# Patient Record
Sex: FEMALE | Race: WHITE | NOT HISPANIC OR LATINO | Employment: UNEMPLOYED
[De-identification: names, ages, dates, MRNs, and addresses within clinical notes are randomized per-mention and may not be internally consistent; named-entity substitution may affect disease eponyms.]

---

## 2022-11-03 ENCOUNTER — EXTERNAL LAB (OUTPATIENT)
Dept: HEALTH INFORMATION MANAGEMENT | Facility: OTHER | Age: 42
End: 2022-11-03

## 2022-11-03 LAB — LAB RESULT: NORMAL

## 2022-11-08 LAB — CYTOLOGY CVX/VAG DOC THIN PREP: NORMAL

## 2023-04-12 ENCOUNTER — OFFICE VISIT (OUTPATIENT)
Dept: OBGYN | Age: 43
End: 2023-04-12

## 2023-04-12 VITALS
OXYGEN SATURATION: 100 % | HEIGHT: 62 IN | DIASTOLIC BLOOD PRESSURE: 85 MMHG | HEART RATE: 81 BPM | BODY MASS INDEX: 24.34 KG/M2 | TEMPERATURE: 97.8 F | WEIGHT: 132.28 LBS | SYSTOLIC BLOOD PRESSURE: 135 MMHG

## 2023-04-12 DIAGNOSIS — Z86.39 HISTORY OF VITAMIN D DEFICIENCY: ICD-10-CM

## 2023-04-12 DIAGNOSIS — N92.0 MENORRHAGIA WITH REGULAR CYCLE: Primary | ICD-10-CM

## 2023-04-12 DIAGNOSIS — R39.15 URINARY URGENCY: ICD-10-CM

## 2023-04-12 DIAGNOSIS — Z12.31 ENCOUNTER FOR SCREENING MAMMOGRAM FOR MALIGNANT NEOPLASM OF BREAST: ICD-10-CM

## 2023-04-12 DIAGNOSIS — N85.2 BULKY OR ENLARGED UTERUS: ICD-10-CM

## 2023-04-12 PROCEDURE — 82306 VITAMIN D 25 HYDROXY: CPT | Performed by: INTERNAL MEDICINE

## 2023-04-12 PROCEDURE — 84443 ASSAY THYROID STIM HORMONE: CPT | Performed by: INTERNAL MEDICINE

## 2023-04-12 PROCEDURE — 36415 COLL VENOUS BLD VENIPUNCTURE: CPT | Performed by: NURSE PRACTITIONER

## 2023-04-12 PROCEDURE — 99214 OFFICE O/P EST MOD 30 MIN: CPT | Performed by: NURSE PRACTITIONER

## 2023-04-12 PROCEDURE — 81003 URINALYSIS AUTO W/O SCOPE: CPT | Performed by: INTERNAL MEDICINE

## 2023-04-12 PROCEDURE — 87086 URINE CULTURE/COLONY COUNT: CPT | Performed by: INTERNAL MEDICINE

## 2023-04-12 PROCEDURE — 85025 COMPLETE CBC W/AUTO DIFF WBC: CPT | Performed by: INTERNAL MEDICINE

## 2023-04-13 LAB
25(OH)D3+25(OH)D2 SERPL-MCNC: 14.5 NG/ML (ref 30–100)
APPEARANCE UR: CLEAR
BASOPHILS # BLD: 0.1 K/MCL (ref 0–0.3)
BASOPHILS NFR BLD: 1 %
BILIRUB UR QL STRIP: NEGATIVE
COLOR UR: ABNORMAL
DEPRECATED RDW RBC: 51.5 FL (ref 39–50)
EOSINOPHIL # BLD: 0.1 K/MCL (ref 0–0.5)
EOSINOPHIL NFR BLD: 2 %
ERYTHROCYTE [DISTWIDTH] IN BLOOD: 14.6 % (ref 11–15)
GLUCOSE UR STRIP-MCNC: NEGATIVE MG/DL
HCT VFR BLD CALC: 44.5 % (ref 36–46.5)
HGB BLD-MCNC: 13.8 G/DL (ref 12–15.5)
HGB UR QL STRIP: NEGATIVE
IMM GRANULOCYTES # BLD AUTO: 0 K/MCL (ref 0–0.2)
IMM GRANULOCYTES # BLD: 0 %
KETONES UR STRIP-MCNC: NEGATIVE MG/DL
LEUKOCYTE ESTERASE UR QL STRIP: NEGATIVE
LYMPHOCYTES # BLD: 1.8 K/MCL (ref 1–4.8)
LYMPHOCYTES NFR BLD: 27 %
MCH RBC QN AUTO: 29.7 PG (ref 26–34)
MCHC RBC AUTO-ENTMCNC: 31 G/DL (ref 32–36.5)
MCV RBC AUTO: 95.7 FL (ref 78–100)
MONOCYTES # BLD: 0.7 K/MCL (ref 0.3–0.9)
MONOCYTES NFR BLD: 10 %
NEUTROPHILS # BLD: 4.1 K/MCL (ref 1.8–7.7)
NEUTROPHILS NFR BLD: 60 %
NITRITE UR QL STRIP: NEGATIVE
NRBC BLD MANUAL-RTO: 0 /100 WBC
PH UR STRIP: 7.5 [PH] (ref 5–7)
PLATELET # BLD AUTO: 212 K/MCL (ref 140–450)
PROT UR STRIP-MCNC: NEGATIVE MG/DL
RBC # BLD: 4.65 MIL/MCL (ref 4–5.2)
SP GR UR STRIP: 1.02 (ref 1–1.03)
TSH SERPL-ACNC: 1.57 MCUNITS/ML (ref 0.35–5)
UROBILINOGEN UR STRIP-MCNC: 0.2 MG/DL
WBC # BLD: 6.8 K/MCL (ref 4.2–11)

## 2023-04-14 LAB — BACTERIA UR CULT: NORMAL

## 2023-04-18 ENCOUNTER — APPOINTMENT (OUTPATIENT)
Dept: OBGYN | Age: 43
End: 2023-04-18

## 2023-05-25 ENCOUNTER — OFFICE VISIT (OUTPATIENT)
Dept: OBGYN | Age: 43
End: 2023-05-25

## 2023-05-25 ENCOUNTER — APPOINTMENT (OUTPATIENT)
Dept: OBGYN | Age: 43
End: 2023-05-25

## 2023-05-25 VITALS
WEIGHT: 132.28 LBS | HEART RATE: 82 BPM | DIASTOLIC BLOOD PRESSURE: 76 MMHG | TEMPERATURE: 97 F | BODY MASS INDEX: 24.34 KG/M2 | HEIGHT: 62 IN | SYSTOLIC BLOOD PRESSURE: 124 MMHG | OXYGEN SATURATION: 98 %

## 2023-05-25 DIAGNOSIS — N92.0 MENORRHAGIA WITH REGULAR CYCLE: Primary | ICD-10-CM

## 2023-05-25 DIAGNOSIS — Z87.42 HISTORY OF MENORRHAGIA: Primary | ICD-10-CM

## 2023-05-25 DIAGNOSIS — N85.2 BULKY OR ENLARGED UTERUS: ICD-10-CM

## 2023-05-25 PROCEDURE — 76830 TRANSVAGINAL US NON-OB: CPT | Performed by: OBSTETRICS & GYNECOLOGY

## 2023-05-25 PROCEDURE — 99213 OFFICE O/P EST LOW 20 MIN: CPT | Performed by: OBSTETRICS & GYNECOLOGY

## 2023-06-12 ENCOUNTER — APPOINTMENT (OUTPATIENT)
Dept: OBGYN | Age: 43
End: 2023-06-12

## 2023-06-27 ENCOUNTER — APPOINTMENT (OUTPATIENT)
Dept: OBGYN | Age: 43
End: 2023-06-27

## 2023-07-10 ENCOUNTER — APPOINTMENT (OUTPATIENT)
Dept: OBGYN | Age: 43
End: 2023-07-10

## 2023-07-17 ENCOUNTER — OFFICE VISIT (OUTPATIENT)
Dept: OBGYN | Age: 43
End: 2023-07-17

## 2023-07-17 VITALS
WEIGHT: 132.28 LBS | HEART RATE: 80 BPM | BODY MASS INDEX: 24.34 KG/M2 | HEIGHT: 62 IN | DIASTOLIC BLOOD PRESSURE: 82 MMHG | SYSTOLIC BLOOD PRESSURE: 132 MMHG | TEMPERATURE: 97 F | OXYGEN SATURATION: 99 %

## 2023-07-17 DIAGNOSIS — N92.4 MENORRHAGIA, PREMENOPAUSAL: ICD-10-CM

## 2023-07-17 DIAGNOSIS — Z30.430 ENCOUNTER FOR INSERTION OF MIRENA IUD: Primary | ICD-10-CM

## 2023-07-17 DIAGNOSIS — Z30.431 IUD CHECK UP: ICD-10-CM

## 2023-07-17 LAB
B-HCG UR QL: NEGATIVE
INTERNAL PROCEDURAL CONTROLS ACCEPTABLE: YES
TEST LOT EXPIRATION DATE: NORMAL
TEST LOT NUMBER: NORMAL

## 2023-07-17 PROCEDURE — 81025 URINE PREGNANCY TEST: CPT | Performed by: OBSTETRICS & GYNECOLOGY

## 2023-07-17 PROCEDURE — 58300 INSERT INTRAUTERINE DEVICE: CPT | Performed by: OBSTETRICS & GYNECOLOGY

## 2023-07-17 PROCEDURE — 99214 OFFICE O/P EST MOD 30 MIN: CPT | Performed by: OBSTETRICS & GYNECOLOGY

## 2023-09-18 ENCOUNTER — OFFICE VISIT (OUTPATIENT)
Dept: OBGYN | Age: 43
End: 2023-09-18

## 2023-09-18 DIAGNOSIS — Z30.431 IUD CHECK UP: ICD-10-CM

## 2023-09-18 PROCEDURE — 76830 TRANSVAGINAL US NON-OB: CPT | Performed by: OBSTETRICS & GYNECOLOGY

## 2023-09-25 ENCOUNTER — APPOINTMENT (OUTPATIENT)
Dept: OBGYN | Age: 43
End: 2023-09-25

## 2023-10-16 ENCOUNTER — OFFICE VISIT (OUTPATIENT)
Dept: OBGYN | Age: 43
End: 2023-10-16

## 2023-10-16 VITALS
HEIGHT: 62 IN | HEART RATE: 67 BPM | DIASTOLIC BLOOD PRESSURE: 68 MMHG | WEIGHT: 140.43 LBS | TEMPERATURE: 98.3 F | OXYGEN SATURATION: 100 % | BODY MASS INDEX: 25.84 KG/M2 | SYSTOLIC BLOOD PRESSURE: 134 MMHG

## 2023-10-16 DIAGNOSIS — Z12.31 VISIT FOR SCREENING MAMMOGRAM: ICD-10-CM

## 2023-10-16 DIAGNOSIS — N81.4 CYSTOCELE WITH UTERINE DESCENSUS: ICD-10-CM

## 2023-10-16 DIAGNOSIS — N83.201 RIGHT OVARIAN CYST: Primary | ICD-10-CM

## 2023-10-16 PROCEDURE — 99213 OFFICE O/P EST LOW 20 MIN: CPT | Performed by: OBSTETRICS & GYNECOLOGY

## 2023-10-16 ASSESSMENT — ENCOUNTER SYMPTOMS
NEUROLOGICAL NEGATIVE: 1
RESPIRATORY NEGATIVE: 1
HEMATOLOGIC/LYMPHATIC NEGATIVE: 1
PSYCHIATRIC NEGATIVE: 1
GASTROINTESTINAL NEGATIVE: 1

## 2023-10-19 ENCOUNTER — TELEPHONE (OUTPATIENT)
Dept: OBGYN | Age: 43
End: 2023-10-19

## 2023-10-19 DIAGNOSIS — N63.10 MASS OF RIGHT BREAST, UNSPECIFIED QUADRANT: Primary | ICD-10-CM

## 2023-10-19 DIAGNOSIS — R92.343 EXTREMELY DENSE TISSUE OF BOTH BREASTS ON MAMMOGRAPHY: ICD-10-CM

## 2023-12-01 ENCOUNTER — TELEPHONE (OUTPATIENT)
Dept: OBGYN | Age: 43
End: 2023-12-01

## 2023-12-05 DIAGNOSIS — N63.10 MASS OF RIGHT BREAST, UNSPECIFIED QUADRANT: ICD-10-CM

## 2024-06-26 ENCOUNTER — TELEPHONE (OUTPATIENT)
Dept: FAMILY MEDICINE | Age: 44
End: 2024-06-26

## 2024-09-24 ENCOUNTER — APPOINTMENT (OUTPATIENT)
Dept: FAMILY MEDICINE | Age: 44
End: 2024-09-24

## 2024-09-24 VITALS
OXYGEN SATURATION: 98 % | SYSTOLIC BLOOD PRESSURE: 123 MMHG | DIASTOLIC BLOOD PRESSURE: 84 MMHG | HEIGHT: 62 IN | WEIGHT: 136.69 LBS | BODY MASS INDEX: 25.15 KG/M2 | HEART RATE: 75 BPM

## 2024-09-24 DIAGNOSIS — B37.31 CANDIDAL VULVOVAGINITIS: ICD-10-CM

## 2024-09-24 DIAGNOSIS — N60.01 BILATERAL BREAST CYSTS: ICD-10-CM

## 2024-09-24 DIAGNOSIS — Z12.11 ENCOUNTER FOR SCREENING FOR MALIGNANT NEOPLASM OF COLON: ICD-10-CM

## 2024-09-24 DIAGNOSIS — Z00.00 ROUTINE GENERAL MEDICAL EXAMINATION AT A HEALTH CARE FACILITY: Primary | ICD-10-CM

## 2024-09-24 DIAGNOSIS — N60.02 BILATERAL BREAST CYSTS: ICD-10-CM

## 2024-09-24 DIAGNOSIS — G43.109 MIGRAINE WITH AURA AND WITHOUT STATUS MIGRAINOSUS, NOT INTRACTABLE: ICD-10-CM

## 2024-09-24 LAB
BASOPHILS # BLD: 0.1 K/MCL (ref 0–0.3)
BASOPHILS NFR BLD: 1 %
DEPRECATED RDW RBC: 46.1 FL (ref 39–50)
EOSINOPHIL # BLD: 0.2 K/MCL (ref 0–0.5)
EOSINOPHIL NFR BLD: 2 %
ERYTHROCYTE [DISTWIDTH] IN BLOOD: 13.8 % (ref 11–15)
HCT VFR BLD CALC: 43.6 % (ref 36–46.5)
HGB BLD-MCNC: 14.6 G/DL (ref 12–15.5)
IMM GRANULOCYTES # BLD AUTO: 0 K/MCL (ref 0–0.2)
IMM GRANULOCYTES # BLD: 0 %
LYMPHOCYTES # BLD: 2.8 K/MCL (ref 1–4.8)
LYMPHOCYTES NFR BLD: 35 %
MCH RBC QN AUTO: 30.4 PG (ref 26–34)
MCHC RBC AUTO-ENTMCNC: 33.5 G/DL (ref 32–36.5)
MCV RBC AUTO: 90.8 FL (ref 78–100)
MONOCYTES # BLD: 0.8 K/MCL (ref 0.3–0.9)
MONOCYTES NFR BLD: 9 %
NEUTROPHILS # BLD: 4.4 K/MCL (ref 1.8–7.7)
NEUTROPHILS NFR BLD: 53 %
NRBC BLD MANUAL-RTO: 0 /100 WBC
PLATELET # BLD AUTO: 279 K/MCL (ref 140–450)
RBC # BLD: 4.8 MIL/MCL (ref 4–5.2)
WBC # BLD: 8.2 K/MCL (ref 4.2–11)

## 2024-09-24 PROCEDURE — 80050 GENERAL HEALTH PANEL: CPT | Performed by: CLINICAL MEDICAL LABORATORY

## 2024-09-24 PROCEDURE — 36415 COLL VENOUS BLD VENIPUNCTURE: CPT | Performed by: FAMILY MEDICINE

## 2024-09-24 PROCEDURE — 81001 URINALYSIS AUTO W/SCOPE: CPT | Performed by: CLINICAL MEDICAL LABORATORY

## 2024-09-24 PROCEDURE — 80061 LIPID PANEL: CPT | Performed by: CLINICAL MEDICAL LABORATORY

## 2024-09-24 RX ORDER — FLUCONAZOLE 150 MG/1
150 TABLET ORAL ONCE
Qty: 1 TABLET | Refills: 0 | Status: SHIPPED | OUTPATIENT
Start: 2024-09-24 | End: 2024-09-24

## 2024-09-24 ASSESSMENT — PATIENT HEALTH QUESTIONNAIRE - PHQ9
2. FEELING DOWN, DEPRESSED OR HOPELESS: NOT AT ALL
SUM OF ALL RESPONSES TO PHQ9 QUESTIONS 1 AND 2: 0
SUM OF ALL RESPONSES TO PHQ9 QUESTIONS 1 AND 2: 0
1. LITTLE INTEREST OR PLEASURE IN DOING THINGS: NOT AT ALL
2. FEELING DOWN, DEPRESSED OR HOPELESS: NOT AT ALL
1. LITTLE INTEREST OR PLEASURE IN DOING THINGS: NOT AT ALL
SUM OF ALL RESPONSES TO PHQ9 QUESTIONS 1 AND 2: 0
SUM OF ALL RESPONSES TO PHQ9 QUESTIONS 1 AND 2: 0
CLINICAL INTERPRETATION OF PHQ2 SCORE: NO FURTHER SCREENING NEEDED
CLINICAL INTERPRETATION OF PHQ2 SCORE: NO FURTHER SCREENING NEEDED

## 2024-09-25 LAB
ALBUMIN SERPL-MCNC: 4.2 G/DL (ref 3.4–5)
ALBUMIN/GLOB SERPL: 1.2 {RATIO} (ref 1–2.4)
ALP SERPL-CCNC: 64 UNITS/L (ref 45–117)
ALT SERPL-CCNC: 17 UNITS/L
ANION GAP SERPL CALC-SCNC: 8 MMOL/L (ref 7–19)
APPEARANCE UR: ABNORMAL
AST SERPL-CCNC: 10 UNITS/L
BACTERIA #/AREA URNS HPF: ABNORMAL /HPF
BILIRUB SERPL-MCNC: 0.3 MG/DL (ref 0.2–1)
BILIRUB UR QL STRIP: NEGATIVE
BUN SERPL-MCNC: 20 MG/DL (ref 6–20)
BUN/CREAT SERPL: 26 (ref 7–25)
CALCIUM SERPL-MCNC: 9.5 MG/DL (ref 8.4–10.2)
CHLORIDE SERPL-SCNC: 107 MMOL/L (ref 97–110)
CHOLEST SERPL-MCNC: 212 MG/DL
CHOLEST/HDLC SERPL: 3.8 {RATIO}
CO2 SERPL-SCNC: 29 MMOL/L (ref 21–32)
COLOR UR: ABNORMAL
CREAT SERPL-MCNC: 0.77 MG/DL (ref 0.51–0.95)
EGFRCR SERPLBLD CKD-EPI 2021: >90 ML/MIN/{1.73_M2}
FASTING DURATION TIME PATIENT: ABNORMAL H
GLOBULIN SER-MCNC: 3.5 G/DL (ref 2–4)
GLUCOSE SERPL-MCNC: 85 MG/DL (ref 70–99)
GLUCOSE UR STRIP-MCNC: NEGATIVE MG/DL
HDLC SERPL-MCNC: 56 MG/DL
HGB UR QL STRIP: NEGATIVE
HYALINE CASTS #/AREA URNS LPF: ABNORMAL /LPF
KETONES UR STRIP-MCNC: NEGATIVE MG/DL
LDLC SERPL CALC-MCNC: 142 MG/DL
LEUKOCYTE ESTERASE UR QL STRIP: ABNORMAL
MUCOUS THREADS URNS QL MICRO: PRESENT
NITRITE UR QL STRIP: NEGATIVE
NONHDLC SERPL-MCNC: 156 MG/DL
PH UR STRIP: 7 [PH] (ref 5–7)
POTASSIUM SERPL-SCNC: 4.8 MMOL/L (ref 3.4–5.1)
PROT SERPL-MCNC: 7.7 G/DL (ref 6.4–8.2)
PROT UR STRIP-MCNC: ABNORMAL MG/DL
RBC #/AREA URNS HPF: ABNORMAL /HPF
SODIUM SERPL-SCNC: 139 MMOL/L (ref 135–145)
SP GR UR STRIP: 1.02 (ref 1–1.03)
SQUAMOUS #/AREA URNS HPF: ABNORMAL /HPF
TRIGL SERPL-MCNC: 69 MG/DL
TSH SERPL-ACNC: 2.3 MCUNITS/ML (ref 0.35–5)
UROBILINOGEN UR STRIP-MCNC: 0.2 MG/DL
WBC #/AREA URNS HPF: ABNORMAL /HPF

## 2024-09-26 ENCOUNTER — TELEPHONE (OUTPATIENT)
Dept: FAMILY MEDICINE | Age: 44
End: 2024-09-26

## 2024-09-26 DIAGNOSIS — R82.90 ABNORMAL URINE: Primary | ICD-10-CM

## 2024-09-26 RX ORDER — RIZATRIPTAN BENZOATE 10 MG/1
10 TABLET ORAL PRN
Qty: 12 TABLET | Refills: 1 | Status: SHIPPED | OUTPATIENT
Start: 2024-09-26

## 2024-10-05 RX ORDER — FLUCONAZOLE 150 MG/1
150 TABLET ORAL ONCE
COMMUNITY
Start: 2024-09-24

## 2024-10-05 ASSESSMENT — ENCOUNTER SYMPTOMS
HEMATOLOGIC/LYMPHATIC NEGATIVE: 1
ALLERGIC/IMMUNOLOGIC NEGATIVE: 1
PSYCHIATRIC NEGATIVE: 1
HEADACHES: 1
EYES NEGATIVE: 1
ENDOCRINE NEGATIVE: 1
CONSTITUTIONAL NEGATIVE: 1
RESPIRATORY NEGATIVE: 1
GASTROINTESTINAL NEGATIVE: 1

## 2024-10-07 ENCOUNTER — EXTERNAL LAB (OUTPATIENT)
Dept: HEALTH INFORMATION MANAGEMENT | Facility: OTHER | Age: 44
End: 2024-10-07

## 2024-10-07 LAB
APPEARANCE UR: CLEAR
BACTERIA #/AREA URNS HPF: ABNORMAL /HPF
BACTERIA UR CULT: ABNORMAL
BILIRUB UR QL: NEGATIVE
COLOR UR: YELLOW
GLUCOSE UR-MCNC: NEGATIVE MG/DL
HGB UR QL: NEGATIVE
HYALINE CASTS #/AREA URNS LPF: ABNORMAL /LPF
KETONES UR-MCNC: NEGATIVE MG/DL
LAB RESULT: NORMAL
LEUKOCYTE ESTERASE UR QL STRIP: ABNORMAL
NITRITE UR QL: NEGATIVE
PH UR: 6 [PH] (ref 5–8)
PROT UR QL: NEGATIVE
RBC #/AREA URNS HPF: ABNORMAL /HPF
SP GR UR: 1.02 (ref 1–1.03)
SQUAMOUS #/AREA URNS HPF: ABNORMAL /HPF
WBC #/AREA URNS HPF: ABNORMAL /HPF

## 2024-10-09 LAB
APPEARANCE UR: CLEAR
BACTERIA #/AREA URNS HPF: ABNORMAL /HPF
BACTERIA UR CULT: ABNORMAL
BACTERIA UR CULT: ABNORMAL
BILIRUB UR QL STRIP: NEGATIVE
COLOR UR: YELLOW
GLUCOSE UR QL STRIP: NEGATIVE
HGB UR QL STRIP: NEGATIVE
HYALINE CASTS #/AREA URNS LPF: ABNORMAL /LPF
KETONES UR QL STRIP: NEGATIVE
LEUKOCYTE ESTERASE UR QL STRIP: ABNORMAL
NITRITE UR QL STRIP: NEGATIVE
PH UR STRIP: 6 [PH] (ref 5–8)
PROT UR QL STRIP: NEGATIVE
RBC #/AREA URNS HPF: ABNORMAL /HPF
SERVICE CMNT-IMP: ABNORMAL
SP GR UR STRIP: 1.02 (ref 1–1.03)
SQUAMOUS #/AREA URNS HPF: ABNORMAL /HPF
WBC #/AREA URNS HPF: ABNORMAL /HPF

## 2024-10-11 ENCOUNTER — TELEPHONE (OUTPATIENT)
Dept: FAMILY MEDICINE | Age: 44
End: 2024-10-11

## 2024-10-11 DIAGNOSIS — Z12.31 ENCOUNTER FOR SCREENING MAMMOGRAM FOR MALIGNANT NEOPLASM OF BREAST: Primary | ICD-10-CM

## 2024-10-11 DIAGNOSIS — N60.02 BILATERAL BREAST CYSTS: ICD-10-CM

## 2024-10-11 DIAGNOSIS — N60.01 BILATERAL BREAST CYSTS: ICD-10-CM

## 2024-10-12 PROBLEM — N60.01 BILATERAL BREAST CYSTS: Status: ACTIVE | Noted: 2024-10-12

## 2024-10-12 PROBLEM — N60.02 BILATERAL BREAST CYSTS: Status: ACTIVE | Noted: 2024-10-12

## 2024-10-12 RX ORDER — RIZATRIPTAN BENZOATE 10 MG/1
10 TABLET, ORALLY DISINTEGRATING ORAL PRN
COMMUNITY
Start: 2024-09-26

## 2024-10-15 ENCOUNTER — TELEPHONE (OUTPATIENT)
Dept: FAMILY MEDICINE | Age: 44
End: 2024-10-15

## 2024-10-22 ENCOUNTER — HOSPITAL ENCOUNTER (OUTPATIENT)
Age: 44
Discharge: HOME | End: 2024-10-22
Attending: EMERGENCY MEDICINE
Payer: COMMERCIAL

## 2024-10-22 VITALS
RESPIRATION RATE: 18 BRPM | TEMPERATURE: 99 F | DIASTOLIC BLOOD PRESSURE: 87 MMHG | SYSTOLIC BLOOD PRESSURE: 140 MMHG | HEART RATE: 60 BPM | OXYGEN SATURATION: 98 %

## 2024-10-22 DIAGNOSIS — R60.0 PERIORBITAL EDEMA OF LEFT EYE: Primary | ICD-10-CM

## 2024-10-22 PROCEDURE — 99203 OFFICE O/P NEW LOW 30 MIN: CPT

## 2024-10-22 PROCEDURE — 99202 OFFICE O/P NEW SF 15 MIN: CPT

## 2024-10-22 NOTE — ED INITIAL ASSESSMENT (HPI)
Left lower orbital swelling while driving today, no headache, no facial droop, no blurry vision, no dizziness, states she felt like a bite on her eye, swelling has improved per

## 2024-10-23 NOTE — DISCHARGE INSTRUCTIONS
Thank you for visiting our immediate care for your health care needs.  Please follow up with your regular doctor in the next 1-2 days.  If you have any additional problems please return to the immediate care.  Please use [facility] as necessary for itchy eyes.

## 2024-10-23 NOTE — ED PROVIDER NOTES
Patient Seen in: [facility]      History     Chief Complaint   Patient presents with    Eye Visual Problem     Stated Complaint: facial nerve numbness-Left Side slight dropping of eye    Subjective:     44-year-old female presents today for evaluation of left periorbital edema that began around noon.  Is improving.  Patient reports she was rubbing her eyes a lot before hand.  She had left itchy eye prior to the swelling.  Denies any foreign body sensation.  No pain in the eye.  No cough runny nose or sore throat.  Vision is normal.  Does not wear contacts or glasses.    Objective:   History reviewed. No pertinent past medical history.           History reviewed. No pertinent surgical history.             Social History     Socioeconomic History    Marital status:    Tobacco Use    Smoking status: Never    Smokeless tobacco: Never   Vaping Use    Vaping status: Never Used   Substance and Sexual Activity    Alcohol use: Not Currently    Drug use: Never                Physical Exam     ED Triage Vitals [10/22/24 1840]   /87   Pulse 60   Resp 18   Temp 98.7 °F (37.1 °C)   Temp src Temporal   SpO2 98 %   O2 Device None (Room air)       Current:/87   Pulse 60   Temp 98.7 °F (37.1 °C) (Temporal)   Resp 18   LMP  (LMP Unknown)   SpO2 98%     Right Eye Chart Acuity: 20/20, Uncorrected  Left Eye Chart Acuity: 20/20, Uncorrected    Physical Exam  Vitals reviewed.   Constitutional:       Appearance: Normal appearance. She is not toxic-appearing.   HENT:      Head: Normocephalic and atraumatic.        Comments: Mild left periorbital edema, nontender, no erythema or ecchymosis.     Mouth/Throat:      Mouth: Mucous membranes are moist.   Eyes:      General: Lids are normal. Lids are everted, no foreign bodies appreciated.      Extraocular Movements: Extraocular movements intact.      Conjunctiva/sclera: Conjunctivae normal.      Pupils: Pupils are equal, round, and reactive to light.      Slit  lamp exam:     Left eye: No corneal flare, corneal ulcer or foreign body.   Cardiovascular:      Rate and Rhythm: Normal rate and regular rhythm.   Pulmonary:      Effort: Pulmonary effort is normal.      Breath sounds: Normal breath sounds.   Musculoskeletal:      Cervical back: Neck supple. No rigidity.   Skin:     General: Skin is warm and dry.   Neurological:      Mental Status: She is alert and oriented to person, place, and time.      Cranial Nerves: No cranial nerve deficit, dysarthria or facial asymmetry.      Sensory: No sensory deficit.      Motor: No weakness.   Psychiatric:         Mood and Affect: Mood normal.         ED Course   Labs Reviewed - No data to display  Imaging:  No results found.                 MDM        44 year old female with left periorbital edema, likely allergy related with itching of the eye earlier that has since improved.  Will discharge home, over-the-counter drops as necessary.    Differential diagnosis (including but not limited to):  Seasonal allergies, allergic rhinitis, allergic conjunctivitis    ED course:  Pulse Ox: 98% on room air which is normal      Comment: Please note this report has been produced using speech recognition software and may contain errors related to that system including errors in grammar, punctuation, and spelling, as well as words and phrases that may be inappropriate. If there are any questions or concerns please feel free to contact the dictating provider for clarification.          Medical Decision Making      Disposition and Plan     Clinical Impression:  1. Periorbital edema of left eye         Disposition:  Discharge  10/22/2024  7:01 pm    Follow-up:  Eder Odonnell MD  [address] [facility] [id number] [phone]    Schedule an appointment as soon as possible for a visit   This is a family practice doctor    Milind Ruvalcaba MD  [address]  [address]  [id number]  [phone]    Schedule an appointment as soon as  possible for a visit   This is an eye specialist.          Medications Prescribed:  There are no discharge medications for this patient.          Supplementary Documentation:                                                     Ochoa Dial MD  10/22/2024  7:01 PM

## 2025-01-06 ENCOUNTER — TELEPHONE (OUTPATIENT)
Dept: FAMILY MEDICINE | Age: 45
End: 2025-01-06

## 2025-10-01 ENCOUNTER — APPOINTMENT (OUTPATIENT)
Dept: FAMILY MEDICINE | Age: 45
End: 2025-10-01